# Patient Record
Sex: FEMALE | Race: WHITE | URBAN - METROPOLITAN AREA
[De-identification: names, ages, dates, MRNs, and addresses within clinical notes are randomized per-mention and may not be internally consistent; named-entity substitution may affect disease eponyms.]

---

## 2019-10-17 ENCOUNTER — APPOINTMENT (OUTPATIENT)
Dept: URBAN - METROPOLITAN AREA CLINIC 282 | Age: 49
Setting detail: DERMATOLOGY
End: 2019-10-17

## 2019-10-17 DIAGNOSIS — L90.5 SCAR CONDITIONS AND FIBROSIS OF SKIN: ICD-10-CM

## 2019-10-17 DIAGNOSIS — L70.0 ACNE VULGARIS: ICD-10-CM

## 2019-10-17 PROCEDURE — OTHER MIPS QUALITY: OTHER

## 2019-10-17 PROCEDURE — OTHER COUNSELING: OTHER

## 2019-10-17 PROCEDURE — OTHER TREATMENT REGIMEN: OTHER

## 2019-10-17 PROCEDURE — 99202 OFFICE O/P NEW SF 15 MIN: CPT

## 2019-10-17 ASSESSMENT — LOCATION DETAILED DESCRIPTION DERM
LOCATION DETAILED: LEFT LOWER CUTANEOUS LIP
LOCATION DETAILED: SUPERIOR MID FOREHEAD
LOCATION DETAILED: RIGHT INFERIOR MEDIAL FOREHEAD
LOCATION DETAILED: LEFT INFERIOR CENTRAL MALAR CHEEK
LOCATION DETAILED: RIGHT CENTRAL MALAR CHEEK

## 2019-10-17 ASSESSMENT — LOCATION SIMPLE DESCRIPTION DERM
LOCATION SIMPLE: SUPERIOR FOREHEAD
LOCATION SIMPLE: RIGHT FOREHEAD
LOCATION SIMPLE: LEFT CHEEK
LOCATION SIMPLE: LEFT LIP
LOCATION SIMPLE: RIGHT CHEEK

## 2019-10-17 ASSESSMENT — LOCATION ZONE DERM
LOCATION ZONE: LIP
LOCATION ZONE: FACE

## 2019-10-17 NOTE — PROCEDURE: COUNSELING
Topical Retinoid counseling:  Patient advised to apply a pea-sized amount only at bedtime and wait 30 minutes after washing their face before applying.  If too drying, patient may add a non-comedogenic moisturizer. The patient verbalized understanding of the proper use and possible adverse effects of retinoids.  All of the patient's questions and concerns were addressed.
Birth Control Pills Counseling: Birth Control Pill Counseling: I discussed with the patient the potential side effects of OCPs including but not limited to increased risk of stroke, heart attack, thrombophlebitis, deep venous thrombosis, hepatic adenomas, breast changes, GI upset, headaches, and depression.  The patient verbalized understanding of the proper use and possible adverse effects of OCPs. All of the patient's questions and concerns were addressed.
Detail Level: Zone
Dapsone Counseling: I discussed with the patient the risks of dapsone including but not limited to hemolytic anemia, agranulocytosis, rashes, methemoglobinemia, kidney failure, peripheral neuropathy, headaches, GI upset, and liver toxicity.  Patients who start dapsone require monitoring including baseline LFTs and weekly CBCs for the first month, then every month thereafter.  The patient verbalized understanding of the proper use and possible adverse effects of dapsone.  All of the patient's questions and concerns were addressed.
Spironolactone Pregnancy And Lactation Text: This medication can cause feminization of the male fetus and should be avoided during pregnancy. The active metabolite is also found in breast milk.
Topical Sulfur Applications Counseling: Topical Sulfur Counseling: Patient counseled that this medication may cause skin irritation or allergic reactions.  In the event of skin irritation, the patient was advised to reduce the amount of the drug applied or use it less frequently.   The patient verbalized understanding of the proper use and possible adverse effects of topical sulfur application.  All of the patient's questions and concerns were addressed.
Benzoyl Peroxide Pregnancy And Lactation Text: This medication is Pregnancy Category C. It is unknown if benzoyl peroxide is excreted in breast milk.
Tetracycline Counseling: Patient counseled regarding possible photosensitivity and increased risk for sunburn.  Patient instructed to avoid sunlight, if possible.  When exposed to sunlight, patients should wear protective clothing, sunglasses, and sunscreen.  The patient was instructed to call the office immediately if the following severe adverse effects occur:  hearing changes, easy bruising/bleeding, severe headache, or vision changes.  The patient verbalized understanding of the proper use and possible adverse effects of tetracycline.  All of the patient's questions and concerns were addressed. Patient understands to avoid pregnancy while on therapy due to potential birth defects.
Tetracycline Pregnancy And Lactation Text: This medication is Pregnancy Category D and not consider safe during pregnancy. It is also excreted in breast milk.
Bactrim Counseling:  I discussed with the patient the risks of sulfa antibiotics including but not limited to GI upset, allergic reaction, drug rash, diarrhea, dizziness, photosensitivity, and yeast infections.  Rarely, more serious reactions can occur including but not limited to aplastic anemia, agranulocytosis, methemoglobinemia, blood dyscrasias, liver or kidney failure, lung infiltrates or desquamative/blistering drug rashes.
Tazorac Pregnancy And Lactation Text: This medication is not safe during pregnancy. It is unknown if this medication is excreted in breast milk.
Tazorac Counseling:  Patient advised that medication is irritating and drying.  Patient may need to apply sparingly and wash off after an hour before eventually leaving it on overnight.  The patient verbalized understanding of the proper use and possible adverse effects of tazorac.  All of the patient's questions and concerns were addressed.
Isotretinoin Pregnancy And Lactation Text: This medication is Pregnancy Category X and is considered extremely dangerous during pregnancy. It is unknown if it is excreted in breast milk.
Topical Clindamycin Counseling: Patient counseled that this medication may cause skin irritation or allergic reactions.  In the event of skin irritation, the patient was advised to reduce the amount of the drug applied or use it less frequently.   The patient verbalized understanding of the proper use and possible adverse effects of clindamycin.  All of the patient's questions and concerns were addressed.
Erythromycin Counseling:  I discussed with the patient the risks of erythromycin including but not limited to GI upset, allergic reaction, drug rash, diarrhea, increase in liver enzymes, and yeast infections.
High Dose Vitamin A Pregnancy And Lactation Text: High dose vitamin A therapy is contraindicated during pregnancy and breast feeding.
Include Pregnancy/Lactation Warning?: No
Azithromycin Pregnancy And Lactation Text: This medication is considered safe during pregnancy and is also secreted in breast milk.
Azithromycin Counseling:  I discussed with the patient the risks of azithromycin including but not limited to GI upset, allergic reaction, drug rash, diarrhea, and yeast infections.
Isotretinoin Counseling: Patient should get monthly blood tests, not donate blood, not drive at night if vision affected, not share medication, and not undergo elective surgery for 6 months after tx completed. Side effects reviewed, pt to contact office should one occur.
Doxycycline Pregnancy And Lactation Text: This medication is Pregnancy Category D and not consider safe during pregnancy. It is also excreted in breast milk but is considered safe for shorter treatment courses.
Birth Control Pills Pregnancy And Lactation Text: This medication should be avoided if pregnant and for the first 30 days post-partum.
High Dose Vitamin A Counseling: Side effects reviewed, pt to contact office should one occur.
Doxycycline Counseling:  Patient counseled regarding possible photosensitivity and increased risk for sunburn.  Patient instructed to avoid sunlight, if possible.  When exposed to sunlight, patients should wear protective clothing, sunglasses, and sunscreen.  The patient was instructed to call the office immediately if the following severe adverse effects occur:  hearing changes, easy bruising/bleeding, severe headache, or vision changes.  The patient verbalized understanding of the proper use and possible adverse effects of doxycycline.  All of the patient's questions and concerns were addressed.
Topical Sulfur Applications Pregnancy And Lactation Text: This medication is Pregnancy Category C and has an unknown safety profile during pregnancy. It is unknown if this topical medication is excreted in breast milk.
Dapsone Pregnancy And Lactation Text: This medication is Pregnancy Category C and is not considered safe during pregnancy or breast feeding.
Spironolactone Counseling: Patient advised regarding risks of diarrhea, abdominal pain, hyperkalemia, birth defects (for female patients), liver toxicity and renal toxicity. The patient may need blood work to monitor liver and kidney function and potassium levels while on therapy. The patient verbalized understanding of the proper use and possible adverse effects of spironolactone.  All of the patient's questions and concerns were addressed.
Bactrim Pregnancy And Lactation Text: This medication is Pregnancy Category D and is known to cause fetal risk.  It is also excreted in breast milk.
Minocycline Counseling: Patient advised regarding possible photosensitivity and discoloration of the teeth, skin, lips, tongue and gums.  Patient instructed to avoid sunlight, if possible.  When exposed to sunlight, patients should wear protective clothing, sunglasses, and sunscreen.  The patient was instructed to call the office immediately if the following severe adverse effects occur:  hearing changes, easy bruising/bleeding, severe headache, or vision changes.  The patient verbalized understanding of the proper use and possible adverse effects of minocycline.  All of the patient's questions and concerns were addressed.
Benzoyl Peroxide Counseling: Patient counseled that medicine may cause skin irritation and bleach clothing.  In the event of skin irritation, the patient was advised to reduce the amount of the drug applied or use it less frequently.   The patient verbalized understanding of the proper use and possible adverse effects of benzoyl peroxide.  All of the patient's questions and concerns were addressed.
Topical Clindamycin Pregnancy And Lactation Text: This medication is Pregnancy Category B and is considered safe during pregnancy. It is unknown if it is excreted in breast milk.
Erythromycin Pregnancy And Lactation Text: This medication is Pregnancy Category B and is considered safe during pregnancy. It is also excreted in breast milk.
Topical Retinoid Pregnancy And Lactation Text: This medication is Pregnancy Category C. It is unknown if this medication is excreted in breast milk.

## 2019-10-17 NOTE — PROCEDURE: TREATMENT REGIMEN
Detail Level: Zone
Plan: Spot treatment with Benzoyle peroxide 10% spot treatment
Plan: xeroxed JALORAINE CME on Medium-depth peels, disc Ematrix& microneed>refer to [facility]

## 2019-10-29 ENCOUNTER — APPOINTMENT (OUTPATIENT)
Age: 49
Setting detail: DERMATOLOGY
End: 2019-10-31

## 2019-10-29 DIAGNOSIS — Z41.9 ENCOUNTER FOR PROCEDURE FOR PURPOSES OTHER THAN REMEDYING HEALTH STATE, UNSPECIFIED: ICD-10-CM

## 2019-10-29 PROCEDURE — OTHER COSMETIC CONSULTATION: LASER RESURFACING: OTHER

## 2019-10-29 PROCEDURE — OTHER PRODUCT LINE (ANTI-AGING): OTHER

## 2019-10-29 PROCEDURE — 99212 OFFICE O/P EST SF 10 MIN: CPT

## 2019-10-29 ASSESSMENT — LOCATION ZONE DERM
LOCATION ZONE: FACE
LOCATION ZONE: NOSE

## 2019-10-29 ASSESSMENT — LOCATION DETAILED DESCRIPTION DERM
LOCATION DETAILED: RIGHT MEDIAL FOREHEAD
LOCATION DETAILED: NASAL SUPRATIP
LOCATION DETAILED: RIGHT CHIN
LOCATION DETAILED: RIGHT INFERIOR CENTRAL MALAR CHEEK
LOCATION DETAILED: LEFT INFERIOR LATERAL MALAR CHEEK

## 2019-10-29 ASSESSMENT — LOCATION SIMPLE DESCRIPTION DERM
LOCATION SIMPLE: RIGHT CHEEK
LOCATION SIMPLE: CHIN
LOCATION SIMPLE: LEFT CHEEK
LOCATION SIMPLE: RIGHT FOREHEAD
LOCATION SIMPLE: NOSE

## 2019-10-29 NOTE — PROCEDURE: PRODUCT LINE (ANTI-AGING)
Product 39 Application Directions: Use as directed
Product 32 Price (In Dollars - Numeric Only, No Special Characters Or $): 38.00
Product 31 Units: 0
Product 15 Price (In Dollars - Numeric Only, No Special Characters Or $): 33.00
Product 52 Price (In Dollars - Numeric Only, No Special Characters Or $): 0.00
Product 31 Price (In Dollars - Numeric Only, No Special Characters Or $): 12.00
Product 5 Price (In Dollars - Numeric Only, No Special Characters Or $): 25.00
Name Of Product 34: Nutrofol Supplement
Product 40 Application Directions: Use as directed each morning
Product 28 Price (In Dollars - Numeric Only, No Special Characters Or $): 13.00
Product 42 Price (In Dollars - Numeric Only, No Special Characters Or $): 32.00
Product 19 Price (In Dollars - Numeric Only, No Special Characters Or $): 72.00
Send Charges To Patient Encounter: Yes
Name Of Product 26: Silagen Scar Gel with SPF
Name Of Product 33: Glycogent
Name Of Product 36: ZO Retinol Repair 0.05%
Product 17 Application Directions: Cleanse with 1 pump morning and night
Product 43 Price (In Dollars - Numeric Only, No Special Characters Or $): 116.00
Name Of Product 6: ZO Wrinkle and Texture Repair
Name Of Product 22: Elta MD 30 Sport
Product 18 Application Directions: Apply directly after cleansing morning and night
Name Of Product 16: ZO Renweal Cream
Product 6 Price (In Dollars - Numeric Only, No Special Characters Or $): 158.00
Name Of Product 35: Elta MD PM Therapy
Name Of Product 37: Benzolyl Peroxide Wash
Product 25 Price (In Dollars - Numeric Only, No Special Characters Or $): 42.00
Product 12 Application Directions: Cleanse 2-3 times per week in the morning.
Product 11 Price (In Dollars - Numeric Only, No Special Characters Or $): 153.00
Product 3 Application Directions: Apply 1/2 a pea size around eyes morning and night after cleansing face.
Product 22 Price (In Dollars - Numeric Only, No Special Characters Or $): 48.00
Name Of Product 39: ZO Exfoliating Polish
Name Of Product 17: Elta MD Foaming Cleanser
Name Of Product 21: Allastin
Product 13 Price (In Dollars - Numeric Only, No Special Characters Or $): $167.00
Product 23 Application Directions: Use at night after cleansing face, layering with Tretinoin or Retinol
Name Of Product 27: Elta MD UV clear
Product 39 Price (In Dollars - Numeric Only, No Special Characters Or $): 73.00
Name Of Product 18: Complexion Renewal Pads
Product 29 Price (In Dollars - Numeric Only, No Special Characters Or $): 50.00
Product 2 Application Directions: Daytime~ after face cleanse before applying SPF
Product 19 Application Directions: Apply 1 pump to clean skin daily as instructed
Product 33 Price (In Dollars - Numeric Only, No Special Characters Or $): 77.00
Product 11 Application Directions: Use as directed around the eyes at night.
Name Of Product 4: Recovery Night Repair
Product 24 Application Directions: Apply after cleansing morning and night.
Detail Level: Zone
Product 38 Price (In Dollars - Numeric Only, No Special Characters Or $): 36.00
Product 1 Price (In Dollars - Numeric Only, No Special Characters Or $): 164.00
Product 28 Application Directions: Apply as directed by provider
Name Of Product 10: ZO Gentle Skin Cleanser
Product 26 Application Directions: Apply to healed scar twice daily.
Product 8 Price (In Dollars - Numeric Only, No Special Characters Or $): 54.00
Product 25 Application Directions: Apply at night after cleansing, twice a week. Leave on for 20-25 minutes then rinse.
Product 40 Price (In Dollars - Numeric Only, No Special Characters Or $): 162.00
Product 23 Price (In Dollars - Numeric Only, No Special Characters Or $): 70.00
Product 14 Price (In Dollars - Numeric Only, No Special Characters Or $): 187.00
Product 6 Application Directions: Applying after cleansing in the evening
Name Of Product 32: Elta MD SPF 30 Lotion
Product 16 Application Directions: Apply at night after cleaning face and treatment creams.
Product 37 Price (In Dollars - Numeric Only, No Special Characters Or $): 14.00
Name Of Product 3: ZO Hydra Firm
Name Of Product 19: ZO Pigment Control
Product 36 Price (In Dollars - Numeric Only, No Special Characters Or $): 114.00
Name Of Product 28: Elta MD Moisturizer
Name Of Product 30: Elta MD Enzyme Gel
Name Of Product 41: Elta MD sport Spf 50
Name Of Product 14: Skin Medica TNS Serum
Product 37 Application Directions: Use in shower morning and after a workout.
Product 8 Application Directions: Use morning and night
Name Of Product 40: ZO Growth Factor Serum
Product 2 Price (In Dollars - Numeric Only, No Special Characters Or $): 102.00
Product 9 Application Directions: Use daily in the morning
Product 30 Price (In Dollars - Numeric Only, No Special Characters Or $): 16.00
Product 36 Application Directions: Use at night, as directed
Product 13 Application Directions: Apply to clean face at night.
Name Of Product 15: Elta MD SPF 40 Daily UV Tinted
Product 15 Application Directions: Apply daily in morning, reapply throughout the day.
Name Of Product 8: ZO Hydrating cleanser
Name Of Product 42: Elta MD spf 47 pure
Product 5 Application Directions: Morning and Night
Name Of Product 23: Pigment Control w/
Product 14 Application Directions: Apply at night to clean skin.
Product 34 Price (In Dollars - Numeric Only, No Special Characters Or $): 88.00
Product 43 Application Directions: Use at night as directed
Product 22 Application Directions: Apply 20 minutes before sun exposure
Product 26 Price (In Dollars - Numeric Only, No Special Characters Or $): 55.00
Name Of Product 11: ZO Intensive Eye Repair
Name Of Product 43: Hydrating Cream
Product 4 Application Directions: Use as directed at night
Name Of Product 20: Elta MD UV Physical SPF 41
Product 41 Price (In Dollars - Numeric Only, No Special Characters Or $): 53.00
Product 3 Price (In Dollars - Numeric Only, No Special Characters Or $): $153.00
Product 19 Units: 1
Product 1 Application Directions: Per directions, in the morning
Name Of Product 29: Elta MD Barrier cream
Name Of Product 9: ZO Smart Tone
Name Of Product 12: ZO Exfoliating Scrub
Product 21 Application Directions: Apply on clean face morning and night
Name Of Product 25: ZO Sulfur Mask
Product 10 Application Directions: Cleanse Morning and Night as directed
Product 27 Application Directions: Apply in the morning as part of daily routine.
Product 21 Price (In Dollars - Numeric Only, No Special Characters Or $): 200.00
Name Of Product 13: ZO Radical Night Repair.
Name Of Product 31: Elta MD lip balm
Name Of Product 5: Kayla HAYES Foaming Cleanser
Name Of Product 2: ZO Vitamin C
Name Of Product 7: ZO Recovery cream
Name Of Product 1: ZO Daily Power Defense
Name Of Product 38: Elta MD AM Therapy
Product 38 Application Directions: Apply to clean skin in the AM as directed

## 2019-11-18 ENCOUNTER — APPOINTMENT (OUTPATIENT)
Age: 49
Setting detail: DERMATOLOGY
End: 2019-11-21

## 2019-11-18 DIAGNOSIS — Z41.9 ENCOUNTER FOR PROCEDURE FOR PURPOSES OTHER THAN REMEDYING HEALTH STATE, UNSPECIFIED: ICD-10-CM

## 2019-11-18 PROCEDURE — OTHER LASER RESURFACING: OTHER

## 2019-11-18 PROCEDURE — 99212 OFFICE O/P EST SF 10 MIN: CPT

## 2019-11-18 PROCEDURE — OTHER OTHER (COSMETIC): OTHER

## 2019-11-18 PROCEDURE — OTHER PRODUCT LINE (ANTI-AGING): OTHER

## 2019-11-18 ASSESSMENT — LOCATION SIMPLE DESCRIPTION DERM
LOCATION SIMPLE: NOSE
LOCATION SIMPLE: LEFT CHEEK
LOCATION SIMPLE: RIGHT FOREHEAD
LOCATION SIMPLE: LEFT SUBMANDIBULAR AREA
LOCATION SIMPLE: RIGHT CHEEK
LOCATION SIMPLE: RIGHT SUBMANDIBULAR AREA
LOCATION SIMPLE: CHIN
LOCATION SIMPLE: SUBMENTAL CHIN

## 2019-11-18 ASSESSMENT — LOCATION DETAILED DESCRIPTION DERM
LOCATION DETAILED: NASAL DORSUM
LOCATION DETAILED: LEFT SUBMANDIBULAR AREA
LOCATION DETAILED: SUBMENTAL CHIN
LOCATION DETAILED: RIGHT MEDIAL FOREHEAD
LOCATION DETAILED: RIGHT INFERIOR CENTRAL MALAR CHEEK
LOCATION DETAILED: RIGHT SUBMANDIBULAR AREA
LOCATION DETAILED: LEFT CHIN
LOCATION DETAILED: LEFT INFERIOR CENTRAL MALAR CHEEK

## 2019-11-18 ASSESSMENT — LOCATION ZONE DERM
LOCATION ZONE: FACE
LOCATION ZONE: NOSE

## 2019-11-18 NOTE — PROCEDURE: PRODUCT LINE (ANTI-AGING)
Product 4 Application Directions: Use as directed at night
Name Of Product 27: Elta MD UV clear
Product 22 Application Directions: Apply 20 minutes before sun exposure
Product 65 Units: 0
Name Of Product 1: ZO Daily Power Defense
Product 18 Price (In Dollars - Numeric Only, No Special Characters Or $): 54.00
Product 37 Application Directions: Use in shower morning and after a workout.
Product 1 Price (In Dollars - Numeric Only, No Special Characters Or $): 164.00
Name Of Product 10: ZO Gentle Skin Cleanser
Name Of Product 17: Elta MD Foaming Cleanser
Product 66 Price (In Dollars - Numeric Only, No Special Characters Or $): 0.00
Product 28 Units: 1
Name Of Product 24: Complexion Renewal Pads
Name Of Product 13: ZO Radical Night Repair.
Name Of Product 6: ZO Wrinkle and Texture Repair
Name Of Product 31: Elta MD lip balm
Product 14 Application Directions: Apply at night to clean skin.
Name Of Product 39: ZO Exfoliating Polish
Product 36 Application Directions: Use at night, as directed
Name Of Product 16: ZO Renweal Cream
Name Of Product 21: Allastin
Name Of Product 4: Recovery Night Repair
Name Of Product 40: ZO Growth Factor Serum
Product 12 Application Directions: Cleanse 2-3 times per week in the morning.
Name Of Product 33: Glycogent
Product 23 Application Directions: Use at night after cleansing face, layering with Tretinoin or Retinol
Name Of Product 7: ZO Recovery cream
Product 16 Application Directions: Apply at night after cleaning face and treatment creams.
Product 15 Price (In Dollars - Numeric Only, No Special Characters Or $): 33.00
Name Of Product 35: Elta MD PM Therapy
Product 5 Price (In Dollars - Numeric Only, No Special Characters Or $): 25.00
Product 33 Application Directions: Use as directed
Product 5 Application Directions: Morning and Night
Product 21 Price (In Dollars - Numeric Only, No Special Characters Or $): 200.00
Name Of Product 19: ZO Pigment Control
Product 1 Application Directions: Per directions, in the morning
Product 35 Price (In Dollars - Numeric Only, No Special Characters Or $): 36.00
Product 39 Price (In Dollars - Numeric Only, No Special Characters Or $): 73.00
Render Product Pricing In Note: Yes
Product 19 Price (In Dollars - Numeric Only, No Special Characters Or $): 72.00
Product 12 Price (In Dollars - Numeric Only, No Special Characters Or $): 77.00
Product 26 Price (In Dollars - Numeric Only, No Special Characters Or $): 55.00
Product 23 Price (In Dollars - Numeric Only, No Special Characters Or $): 70.00
Product 4 Price (In Dollars - Numeric Only, No Special Characters Or $): 116.00
Product 40 Application Directions: Use as directed each morning
Product 40 Price (In Dollars - Numeric Only, No Special Characters Or $): 162.00
Name Of Product 5: Kayla HAYES Foaming Cleanser
Name Of Product 42: Elta MD spf 47 pure
Name Of Product 12: ZO Exfoliating Scrub
Name Of Product 29: Elta MD Barrier cream
Name Of Product 32: Elta MD SPF 30 Lotion
Name Of Product 20: Elta MD UV Physical SPF 41
Product 22 Price (In Dollars - Numeric Only, No Special Characters Or $): 48.00
Product 34 Price (In Dollars - Numeric Only, No Special Characters Or $): 88.00
Product 25 Application Directions: Apply at night after cleansing, twice a week. Leave on for 20-25 minutes then rinse.
Product 11 Price (In Dollars - Numeric Only, No Special Characters Or $): 153.00
Product 24 Application Directions: Apply after cleansing morning and night.
Product 9 Application Directions: Use daily in the morning
Product 6 Application Directions: Applying after cleansing in the evening
Name Of Product 9: ZO Smart Tone
Product 19 Application Directions: Apply 1 pump to clean skin daily as instructed
Name Of Product 43: Hydrating Cream
Product 13 Price (In Dollars - Numeric Only, No Special Characters Or $): $167.00
Product 13 Application Directions: Apply to clean face at night.
Name Of Product 3: ZO Hydra Firm
Product 14 Price (In Dollars - Numeric Only, No Special Characters Or $): 187.00
Name Of Product 41: Elta MD sport Spf 50
Product 26 Application Directions: Apply to healed scar twice daily.
Product 41 Price (In Dollars - Numeric Only, No Special Characters Or $): 53.00
Product 10 Application Directions: Cleanse Morning and Night as directed
Product 3 Price (In Dollars - Numeric Only, No Special Characters Or $): $153.00
Product 29 Price (In Dollars - Numeric Only, No Special Characters Or $): 50.00
Product 29 Application Directions: Apply as directed by provider
Product 25 Price (In Dollars - Numeric Only, No Special Characters Or $): 42.00
Product 38 Application Directions: Apply to clean skin in the AM as directed
Name Of Product 36: ZO Retinol Repair 0.05%
Name Of Product 34: Nutrofol Supplement
Product 2 Price (In Dollars - Numeric Only, No Special Characters Or $): 102.00
Name Of Product 22: Elta MD 30 Sport
Product 28 Price (In Dollars - Numeric Only, No Special Characters Or $): 13.00
Product 27 Application Directions: Apply in the morning as part of daily routine.
Product 32 Price (In Dollars - Numeric Only, No Special Characters Or $): 38.00
Product 11 Application Directions: Use as directed around the eyes at night.
Product 31 Price (In Dollars - Numeric Only, No Special Characters Or $): 12.00
Product 2 Application Directions: Daytime~ after face cleanse before applying SPF
Product 15 Application Directions: Apply daily in morning, reapply throughout the day.
Name Of Product 28: Elta MD Moisturizer
Product 8 Application Directions: Use morning and night
Name Of Product 14: Skin Medica TNS Serum
Product 18 Application Directions: Apply directly after cleansing morning and night
Name Of Product 26: Silagen Scar Gel with SPF
Product 42 Price (In Dollars - Numeric Only, No Special Characters Or $): 32.00
Product 30 Price (In Dollars - Numeric Only, No Special Characters Or $): 16.00
Product 17 Application Directions: Cleanse with 1 pump morning and night
Detail Level: Zone
Product 36 Price (In Dollars - Numeric Only, No Special Characters Or $): 114.00
Product 6 Price (In Dollars - Numeric Only, No Special Characters Or $): 158.00
Product 21 Application Directions: Apply on clean face morning and night
Name Of Product 30: Elta MD Enzyme Gel
Name Of Product 11: ZO Intensive Eye Repair
Name Of Product 37: Benzolyl Peroxide Wash
Name Of Product 23: Pigment Control w/
Product 3 Application Directions: Apply 1/2 a pea size around eyes morning and night after cleansing face.
Name Of Product 2: ZO Vitamin C
Name Of Product 38: Elta MD AM Therapy
Product 37 Price (In Dollars - Numeric Only, No Special Characters Or $): 14.00
Name Of Product 15: Elta MD SPF 40 Daily UV Tinted
Product 43 Application Directions: Use at night as directed
Name Of Product 25: ZO Sulfur Mask
Name Of Product 8: ZO Hydrating cleanser

## 2019-11-18 NOTE — PROCEDURE: OTHER (COSMETIC)
Other (Free Text): Patient tolerated treatment.\\nWe reviewed post care, staying out of direct sunlight and wearing clothing to cover treated area when outside for extended periods. \\nPatient was also given post treatment products and written instructions. \\n\\nLaser Settings:\\nSuperficial:\\n12.5 J 15%\\nDeep:\\n60J 60% \\n\\nFollow up 1 month. bbw
Detail Level: Zone
Other (Free Text): Post care products:\\n\\nElta MD Foaming cleanser\\ReannaO Hydrating cream\\nElta MD Moisturizer \\nElta MD spf 46

## 2019-11-18 NOTE — PROCEDURE: LASER RESURFACING
Treatment Level (Optional): Superficial
Consent: Written consent obtained, risks reviewed including but not limited to crusting, scabbing, blistering, scarring, darker or lighter pigmentary change, incomplete improvement of dyschromia, wrinkles, prolonged erythema and facial swelling, infection and bleeding.
Treatment Number: 1
Energy(Mj): 60
Pulse Duration (Usec): 0
Price (Use Numbers Only, No Special Characters Or $): 9567.59
Post-Care Instructions: I reviewed with the patient in detail post-care instructions. Patient should avoid sun until area fully healed. Pt should apply vaseline to treated areas, and remove crusts gently with water-vinegar soaks.
Wavelength: 10,600nm
Detail Level: Detailed
Laser Type: Fraxel Re:pair
Device Serial Number (Optional): AccuPulse Fractional
External Cooling Fan Speed: 5

## 2019-11-25 ENCOUNTER — APPOINTMENT (OUTPATIENT)
Age: 49
Setting detail: DERMATOLOGY
End: 2019-12-02

## 2019-11-25 DIAGNOSIS — Z41.9 ENCOUNTER FOR PROCEDURE FOR PURPOSES OTHER THAN REMEDYING HEALTH STATE, UNSPECIFIED: ICD-10-CM

## 2019-11-25 PROCEDURE — OTHER OTHER (COSMETIC): OTHER

## 2019-11-25 ASSESSMENT — LOCATION DETAILED DESCRIPTION DERM
LOCATION DETAILED: RIGHT CHIN
LOCATION DETAILED: NASAL SUPRATIP
LOCATION DETAILED: RIGHT INFERIOR CENTRAL MALAR CHEEK
LOCATION DETAILED: SUPERIOR MID FOREHEAD
LOCATION DETAILED: LEFT INFERIOR CENTRAL MALAR CHEEK

## 2019-11-25 ASSESSMENT — LOCATION ZONE DERM
LOCATION ZONE: NOSE
LOCATION ZONE: FACE

## 2019-11-25 ASSESSMENT — LOCATION SIMPLE DESCRIPTION DERM
LOCATION SIMPLE: CHIN
LOCATION SIMPLE: SUPERIOR FOREHEAD
LOCATION SIMPLE: NOSE
LOCATION SIMPLE: RIGHT CHEEK
LOCATION SIMPLE: LEFT CHEEK

## 2019-11-25 NOTE — PROCEDURE: OTHER (COSMETIC)
Other (Free Text): Patient is her post Accupulse laser. She is healing well, still has a little erythema but overall skin looks great. Photos were taken. \\nPatient is going to wait 6 months and then follow up to see if she wants to do a second treatment or possible Microneedling. \\nFollow up 6 months. bbw
Detail Level: Zone